# Patient Record
Sex: FEMALE | Race: WHITE | Employment: UNEMPLOYED | URBAN - METROPOLITAN AREA
[De-identification: names, ages, dates, MRNs, and addresses within clinical notes are randomized per-mention and may not be internally consistent; named-entity substitution may affect disease eponyms.]

---

## 2018-10-14 ENCOUNTER — HOSPITAL ENCOUNTER (EMERGENCY)
Facility: HOSPITAL | Age: 23
Discharge: HOME | End: 2018-10-14
Attending: EMERGENCY MEDICINE
Payer: COMMERCIAL

## 2018-10-14 VITALS
HEART RATE: 74 BPM | HEIGHT: 64 IN | SYSTOLIC BLOOD PRESSURE: 102 MMHG | BODY MASS INDEX: 22.2 KG/M2 | WEIGHT: 130 LBS | OXYGEN SATURATION: 98 % | RESPIRATION RATE: 16 BRPM | DIASTOLIC BLOOD PRESSURE: 53 MMHG | TEMPERATURE: 98 F

## 2018-10-14 DIAGNOSIS — S01.01XA LACERATION OF SCALP WITHOUT FOREIGN BODY, INITIAL ENCOUNTER: Primary | ICD-10-CM

## 2018-10-14 PROCEDURE — 99283 EMERGENCY DEPT VISIT LOW MDM: CPT

## 2018-10-14 PROCEDURE — 12002 RPR S/N/AX/GEN/TRNK2.6-7.5CM: CPT

## 2018-10-14 RX ORDER — LIDOCAINE HYDROCHLORIDE AND EPINEPHRINE 20; 5 MG/ML; UG/ML
20 INJECTION, SOLUTION EPIDURAL; INFILTRATION; INTRACAUDAL; PERINEURAL ONCE
Status: COMPLETED | OUTPATIENT
Start: 2018-10-14 | End: 2018-10-14

## 2018-10-14 NOTE — ED NOTES
Assumed care of pt from triage. Pts mom states pt had a seizure lasting approx 2 min. Pt fell against a wall causing a lac to back right side of pts head. Pt has hx of seizures since age 11. Pts mom states \"We are not here for the seizures though. \"  P

## 2018-10-14 NOTE — ED PROVIDER NOTES
Patient Seen in: [facility] Emergency Department    History   Patient presents with:  Seizure Disorder (neurologic)  Laceration Abrasion (integumentary)    Stated Complaint: Hx of seizure, head injury post seizure today    HPI    59-year-old femal well-nourished. No distress. HENT:   Head: Normocephalic.    Mouth/Throat: Oropharynx is clear and moist.   3 cm linear laceration to occipital scalp, no active bleeding   Eyes: Conjunctivae and EOM are normal. Pupils are equal, round, and reactive to lig [place] [address] [place] [id number] [phone]    Schedule an appointment as soon as possible for a visit in 1 week  For staple removal        Medications Prescribed:  There are no discharge medications for this patient.

## 2018-10-14 NOTE — ED INITIAL ASSESSMENT (HPI)
PT WITH HX OF EPILEPSY, HAD SEIZURE, POSTICAL CURRENTLY, BROUGHT IN BY MOM BECAUSE PT HIT HEAD AND HAS A HEAD LAC. SEIZURE WITNESSED BY MOM. PER MOM PT HAD SEIZURE LAST NIGHT.

## 2019-10-16 ENCOUNTER — HOSPITAL ENCOUNTER (EMERGENCY)
Facility: HOSPITAL | Age: 24
Discharge: HOME | End: 2019-10-16
Attending: EMERGENCY MEDICINE
Payer: COMMERCIAL

## 2019-10-16 ENCOUNTER — APPOINTMENT (OUTPATIENT)
Dept: CT IMAGING | Facility: HOSPITAL | Age: 24
End: 2019-10-16
Attending: EMERGENCY MEDICINE
Payer: COMMERCIAL

## 2019-10-16 VITALS
SYSTOLIC BLOOD PRESSURE: 112 MMHG | HEIGHT: 64 IN | RESPIRATION RATE: 18 BRPM | BODY MASS INDEX: 22.2 KG/M2 | OXYGEN SATURATION: 98 % | DIASTOLIC BLOOD PRESSURE: 75 MMHG | WEIGHT: 130 LBS | HEART RATE: 82 BPM

## 2019-10-16 DIAGNOSIS — G40.919 BREAKTHROUGH SEIZURE (HCC): ICD-10-CM

## 2019-10-16 DIAGNOSIS — G40.909 SEIZURE DISORDER (HCC): Primary | ICD-10-CM

## 2019-10-16 PROCEDURE — 36415 COLL VENOUS BLD VENIPUNCTURE: CPT

## 2019-10-16 PROCEDURE — 85025 COMPLETE CBC W/AUTO DIFF WBC: CPT | Performed by: EMERGENCY MEDICINE

## 2019-10-16 PROCEDURE — 70450 CT HEAD/BRAIN W/O DYE: CPT | Performed by: EMERGENCY MEDICINE

## 2019-10-16 PROCEDURE — 80053 COMPREHEN METABOLIC PANEL: CPT | Performed by: EMERGENCY MEDICINE

## 2019-10-16 PROCEDURE — 99284 EMERGENCY DEPT VISIT MOD MDM: CPT

## 2019-10-16 RX ORDER — CLOBAZAM 20 MG/1
20 TABLET ORAL 2 TIMES DAILY
COMMUNITY

## 2019-10-16 RX ORDER — FELBAMATE 600 MG/1
1200 TABLET ORAL 2 TIMES DAILY
COMMUNITY

## 2019-10-16 RX ORDER — FELBAMATE 600 MG/1
900 TABLET ORAL DAILY
COMMUNITY

## 2019-10-16 RX ORDER — CLONAZEPAM 0.5 MG/1
0.5 TABLET ORAL NIGHTLY PRN
COMMUNITY

## 2019-10-17 NOTE — ED PROVIDER NOTES
Patient Seen in: [facility] Department      History   Patient presents with:  Seizure Disorder (neurologic)    Stated Complaint: multiple seizures    HPI    24 yo female with h/o seizure disorder who is on multiple medications.  No recent Conjunctiva/sclera: Conjunctivae normal.      Pupils: Pupils are equal, round, and reactive to light. Neck:      Musculoskeletal: Normal range of motion and neck supple. Cardiovascular:      Rate and Rhythm: Normal rate and regular rhythm.       Heart s NONCONTRAST    IMPRESSION:    No acute intracranial finding. No evidence of intracranial hemorrhage, mass-effect, midline shift, or extra-axial fluid collection. Labs and imaging reviewed. Mild anemia. Patient at baseline in ED.  Discharge home wi

## 2019-10-17 NOTE — ED INITIAL ASSESSMENT (HPI)
Pt has epilepsy and has had 4 seizures in the last 3 days. There has been no recent change in medications. Denies any headache but states she fell during one seizure and was sent to ED by her neurologist to be evaluated.

## (undated) NOTE — ED AVS SNAPSHOT
Braden Slider   MRN: [id number]    Department:  [facility] Emergency Department   Date of Visit:  10/14/2018           Disclosure     Insurance plans vary and the physician(s) referred by the ER may not be covered by your plan.  Please contact your CARE PHYSICIAN AT ONCE OR RETURN IMMEDIATELY TO THE EMERGENCY DEPARTMENT. If you have been prescribed any medication(s), please fill your prescription right away and begin taking the medication(s) as directed.   If you believe that any of the medications

## (undated) NOTE — ED AVS SNAPSHOT
Brennon Ibarra   MRN: [id number]    Department:  [facility] Emergency Department   Date of Visit:  10/16/2019           Disclosure     Insurance plans vary and the physician(s) referred by the ER may not be covered by your plan.  Please contact your CARE PHYSICIAN AT ONCE OR RETURN IMMEDIATELY TO THE EMERGENCY DEPARTMENT. If you have been prescribed any medication(s), please fill your prescription right away and begin taking the medication(s) as directed.   If you believe that any of the medications